# Patient Record
Sex: FEMALE | Race: WHITE | NOT HISPANIC OR LATINO | URBAN - METROPOLITAN AREA
[De-identification: names, ages, dates, MRNs, and addresses within clinical notes are randomized per-mention and may not be internally consistent; named-entity substitution may affect disease eponyms.]

---

## 2017-01-25 NOTE — PATIENT DISCUSSION
CATARACTS, OU - VISUALLY SIGNIFICANT. PT TO CALL WHEN READY TO PROCEED WITH SURGERY. SCHEDULE OD EYE  FIRST THEN LATER IN THE OS EYE  IF VISUAL SYMPTOMS PERSIST.

## 2017-01-25 NOTE — PATIENT DISCUSSION
Trial (take home)[facility][phone]&nbsp; &nbsp; &nbsp;

## 2017-02-08 NOTE — PATIENT DISCUSSION
CATARACT, OU - VISUALLY SIGNIFICANT. SCHEDULE PHACO WITH IOL OD FIRST THEN OS IF VISUAL SYMPTOMS PERSIST.

## 2017-02-08 NOTE — PATIENT DISCUSSION
Considering Cataract Surgery Counseling: I have discussed the option of scheduling surgery versus following, as well as the risks, benefits and alternatives of cataract surgery with the patient. It was explained that the surgery is elective, there is no rush and there is no harm in waiting to have surgery. It was also explained that there is no guarantee that removing the cataract will improve their vision. The patient understands and desires to follow for now and will consider his/her options.

## 2017-04-11 NOTE — PATIENT DISCUSSION
K SICCA: IMPROVING. CONTINUE DISAPPEARING PRESERVATIVE OR PRESERVATIVE FREE ARTIFICIAL TEARS BID &ndash; QID4-6X A DAY, OU AND THE DAILY INTAKE OF OMEGA-3 DHA/EPA FATTY ACIDS TO HELP RELIEVE SYMPTOMS. ADD PUNCTAL PLUGS TODAY. ADD NIGHTLY LUBRICATING OINTMENT OR GEL. CONTINUE RESTASIS BID.

## 2017-04-11 NOTE — PATIENT DISCUSSION
Continue: Restasis (cyclosporine): dropperette: 0.05% 1 drop twice a day as directed into both eyes 02-

## 2017-05-09 NOTE — PATIENT DISCUSSION
CATARACTS, OU - VISUALLY SIGNIFICANT. SCHEDULE PHACO WITH IOL OD FIRST THEN LATER OS IF VISUAL SYMPTOMS PERSIST.

## 2017-05-09 NOTE — PATIENT DISCUSSION
REFRACTIVE ERROR, OU - MYOPIA W/ASTIGMATISM :  PATIENT DESIRES TO HAVE THEIR REFRACTIVE ERROR SURGICALLY CORRECTED.

## 2017-05-09 NOTE — PATIENT DISCUSSION
Surgery Counseling:  I have discussed the option of glasses versus cataract surgery versus following, It was explained that when vision no longer meets the patient's visual needs and a new prescription for glasses is not likely to improve the patient's visual symptoms, the option of cataract surgery is a reasonable next step. It was explained that there is no guarantee that removing the cataract will improve their visual symptoms; however, it is believed that the cataract is contributing to the patient's visual impairment and surgery may noticeably improve both the visual and functional status of the patient. After this discussion, the patient desires to proceed with cataract surgery with implantation of an intraocular lens to improve their vision for driving.

## 2017-06-09 NOTE — PATIENT DISCUSSION
Besivance in the surgical eye 3 times per day for 10 days, then discontinue; Prolensa once per day for 4 weeks, then discontinue;

## 2017-06-28 NOTE — PATIENT DISCUSSION
S/P CATARACT SURGERY WITH IOL, OU. DOING WELL. CONTINUE PROLENSA QDAY. SPECS RX OFFERED. RETURN FOR FOLLOW-UP IN 3 MONTHS FOR DFE OU OR SOONER IF NEEDED.

## 2017-06-28 NOTE — PATIENT DISCUSSION
New Prescription: Maxitrol (neomycin-polymyxin-dexameth): ointment: 3.5-10,000-0.1 mg-unit/g-% a small amount at bedtime into both eyes 06-

## 2017-06-28 NOTE — PATIENT DISCUSSION
Stopped Today: Besivance (besifloxacin): drops,suspension: 0.6% 1 drop three times a day into affected eye 06-

## 2017-06-28 NOTE — PATIENT DISCUSSION
Meibomian Gland Dysfunction Counseling:  I have explained the diagnosis of MGD and its pathophysiology. I have explained to the patient that a cure for MGD is not usually possible, and successful management is dependent on patient compliance with the treatment regimen. I instructed the patient on using warm compresses and eyelid scrubs. I also instructed the patient on using artificial tears two to four times per day. Return for follow-up as scheduled or sooner if symptoms worsen.

## 2017-06-28 NOTE — PATIENT DISCUSSION
MEIBOMIAN GLAND DYSFUNCTION, OU:  PRESCRIBE WARM COMPRESSES AND EYELID SCRUBS QD-BID, ARTIFICIAL TEARS BID-QID, THE DAILY INTAKE OF OMEGA-3 FATTY ACIDS   RETURN FOR FOLLOW-UP AS SCHEDULED.

## 2017-09-27 NOTE — PATIENT DISCUSSION
K SICCA: IMPROVING. PT INTOLERANT OF RESTASIS - IMPROVED AFTER LL PLUGS. CONTINUE PRESERVATIVE FREE ARTIFICIAL TEARS BID &ndash; QID4-6X A DAY, OU AND THE DAILY INTAKE OF OMEGA-3 DHA/EPA FATTY ACIDS TO HELP RELIEVE SYMPTOMS.

## 2017-10-16 ENCOUNTER — IMPORTED ENCOUNTER (OUTPATIENT)
Dept: URBAN - METROPOLITAN AREA CLINIC 31 | Facility: CLINIC | Age: 57
End: 2017-10-16

## 2017-10-16 PROCEDURE — 92015 DETERMINE REFRACTIVE STATE: CPT

## 2017-10-16 PROCEDURE — 92004 COMPRE OPH EXAM NEW PT 1/>: CPT

## 2018-10-17 NOTE — PATIENT DISCUSSION
K SICCA: IMPROVING. PT INTOLERANT OF RESTASIS - IMPROVED AFTER LL PLUGS BUT PERSISTENT FLUCATUATION IN VISION. ADD XIIDRA BID OU. CONTINUE PRESERVATIVE FREE ARTIFICIAL TEARS BID &ndash; QID4-6X A DAY, OU AND THE DAILY INTAKE OF OMEGA-3 DHA/EPA FATTY ACIDS TO HELP RELIEVE SYMPTOMS.

## 2019-02-12 NOTE — PATIENT DISCUSSION
K SICCA: IMPROVING. IMPROVED AFTER LL PLUGS BUT PERSISTENT FLUCATUATION IN VISION. CONTINUE PRESERVATIVE FREE ARTIFICIAL TEARS BID &ndash; QID4-6X A DAY, OU AND THE DAILY INTAKE OF OMEGA-3 DHA/EPA FATTY ACIDS TO HELP RELIEVE SYMPTOMS.

## 2019-10-04 NOTE — PATIENT DISCUSSION
PT COULD NOT GET  TOBRADEX DUE TO COST SWITCH TO MAXITROL MARC TID, KEFLEX 500 TID FOR 1 WEEK, AGGRESSIVE LUBRICATION

## 2019-10-25 NOTE — PATIENT DISCUSSION
MELANIAUM , OS. OK TO CONTINUE  MAXITROL MARC AND WARM COMPRESSES. DISCUSSED THE OPTION OF ADDING KENALOG INJ IF STILL A PROBLEM NEXT VISIT.  FOLLOW

## 2019-10-25 NOTE — PATIENT DISCUSSION
Hordeolum Counseling:  I explained to the patient that a Hordeolum is an inflammatory reaction to trapped oil secretions in the eyelid. I also explained that while Hordeolum usually respond well to treatment, some people are prone to recurrences of them. The patient was instructed on the use of warm compresses on the Hordeolum for five to ten minutes, three to four times per day. Return for follow-up as scheduled or sooner if symptoms worsen.

## 2020-05-05 NOTE — PATIENT DISCUSSION
K SICCA: STABLE. S/P LL PLUGS. CONTINUE PRESERVATIVE FREE ARTIFICIAL TEARS BID &ndash; QID4-6X A DAY, OU AND THE DAILY INTAKE OF OMEGA-3 DHA/EPA FATTY ACIDS TO HELP RELIEVE SYMPTOMS.

## 2020-12-08 NOTE — PATIENT DISCUSSION
CHALAZION OS: PRESCRIBED WARM COMPRESSES, EYELID SCRUBS AND DOXYCYCLINE 50MG BID PO X 2 WEEKS AND TOBRADEX MARC QHS X 2 WEEKS.

## 2020-12-08 NOTE — PATIENT DISCUSSION
New Prescription: doxycycline hyclate (doxycycline hyclate): tablet: 50 mg 1 tablet twice a day as directed by mouth 12-

## 2020-12-08 NOTE — PATIENT DISCUSSION
New Prescription: TobraDex (tobramycin-dexamethasone): ointment: 0.3-0.1% 1 a small amount at bedtime as directed into left eye 12-

## 2021-06-01 NOTE — PATIENT DISCUSSION
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 S/P LL PLUGS. CONTINUE PRESERVATIVE FREE ARTIFICIAL TEARS BID &ndash; QID4-6X A DAY, OU AND THE DAILY INTAKE OF OMEGA-3 DHA/EPA FATTY ACIDS TO HELP RELIEVE SYMPTOMS.

## 2021-08-10 ENCOUNTER — OFFICE VISIT (OUTPATIENT)
Dept: URBAN - METROPOLITAN AREA CLINIC 63 | Facility: CLINIC | Age: 61
End: 2021-08-10

## 2021-08-19 ENCOUNTER — OFFICE VISIT (OUTPATIENT)
Dept: URBAN - METROPOLITAN AREA CLINIC 63 | Facility: CLINIC | Age: 61
End: 2021-08-19

## 2021-09-01 ENCOUNTER — OFFICE VISIT (OUTPATIENT)
Dept: URBAN - METROPOLITAN AREA TELEHEALTH 2 | Facility: TELEHEALTH | Age: 61
End: 2021-09-01

## 2021-10-04 ENCOUNTER — OFFICE VISIT (OUTPATIENT)
Dept: URBAN - METROPOLITAN AREA SURGERY CENTER 4 | Facility: SURGERY CENTER | Age: 61
End: 2021-10-04

## 2021-10-05 LAB — PATHOLOGY (INDENTED REPORT): (no result)

## 2021-10-13 ENCOUNTER — OFFICE VISIT (OUTPATIENT)
Dept: URBAN - METROPOLITAN AREA CLINIC 63 | Facility: CLINIC | Age: 61
End: 2021-10-13

## 2022-01-08 NOTE — PATIENT DISCUSSION
K SICCA: PRESCRIBED DISAPPEARING PRESERVATIVE OR PRESERVATIVE FREE ARTIFICIAL TEARS BID &ndash; QID4-6X A DAY, OU AND THE DAILY INTAKE OF OMEGA-3 DHA/EPA FATTY ACIDS TO HELP RELIEVE SYMPTOMS. ADD NIGHTLY LUBRICATING OINTMENT OR GEL. PRESCRIBED RESTASIS BID WITH LTOEMAX BRIDGE. [facility]VM portal message sent

## 2022-03-31 NOTE — PATIENT DISCUSSION
Educated patient on findings. Prescribe Keflex 500mg BID po x 7 days and Maxitrol jeovany QHS OD x 7 days. Continue warm compresses BID-QID and recommend Refresh MEGA3 BID-QID OU. Advised to call/RTC if si/sx persist or worsen. Monitor.

## 2022-04-01 ASSESSMENT — VISUAL ACUITY
OS_CC: 20/100-1
OS_SC: 20/25+3
OD_SC: 20/20-1
OD_CC: 20/400

## 2022-04-01 ASSESSMENT — TONOMETRY
OD_IOP_MMHG: 14
OS_IOP_MMHG: 14

## 2022-07-09 ENCOUNTER — TELEPHONE ENCOUNTER (OUTPATIENT)
Dept: URBAN - METROPOLITAN AREA CLINIC 121 | Facility: CLINIC | Age: 62
End: 2022-07-09

## 2022-07-09 RX ORDER — ASPIRIN 81 MG/1
TABLET, FILM COATED ORAL
Refills: 0 | OUTPATIENT
Start: 2021-08-31 | End: 2021-09-01

## 2022-07-09 RX ORDER — DULOXETINE 30 MG/1
CAPSULE, DELAYED RELEASE PELLETS ORAL
Refills: 0 | OUTPATIENT
Start: 2021-06-30 | End: 2021-09-01

## 2022-07-09 RX ORDER — ATORVASTATIN CALCIUM 10 MG/1
TABLET, FILM COATED ORAL
Refills: 0 | OUTPATIENT
Start: 2020-10-29 | End: 2021-09-01

## 2022-07-09 RX ORDER — ERGOCALCIFEROL 1.25 MG/1
CAPSULE ORAL
Refills: 0 | OUTPATIENT
Start: 2021-07-25 | End: 2021-09-01

## 2022-07-09 RX ORDER — TAMSULOSIN HYDROCHLORIDE 0.4 MG/1
CAPSULE ORAL
Refills: 0 | OUTPATIENT
Start: 2020-09-03 | End: 2021-08-31

## 2022-07-09 RX ORDER — SODIUM SULFATE, POTASSIUM SULFATE, MAGNESIUM SULFATE 17.5; 3.13; 1.6 G/ML; G/ML; G/ML
TAKE AS DIRECTED FOR COLON PREP SOLUTION, CONCENTRATE ORAL TAKE AS DIRECTED
Refills: 0 | OUTPATIENT
Start: 2016-04-25 | End: 2021-08-31

## 2022-07-10 ENCOUNTER — TELEPHONE ENCOUNTER (OUTPATIENT)
Dept: URBAN - METROPOLITAN AREA CLINIC 121 | Facility: CLINIC | Age: 62
End: 2022-07-10

## 2022-07-10 RX ORDER — VITAMIN B COMPLEX
CAPSULE ORAL
Refills: 0 | Status: ACTIVE | COMMUNITY
Start: 2021-09-01

## 2022-07-10 RX ORDER — ATORVASTATIN CALCIUM 10 MG/1
TABLET, FILM COATED ORAL
Refills: 0 | Status: ACTIVE | COMMUNITY
Start: 2021-09-01

## 2022-07-10 RX ORDER — ERGOCALCIFEROL 1.25 MG/1
CAPSULE ORAL
Refills: 0 | Status: ACTIVE | COMMUNITY
Start: 2021-09-01

## 2022-07-10 RX ORDER — CALCIUM CARBONATE/VITAMIN D3 500-10/5ML
LIQUID (ML) ORAL
Refills: 0 | Status: ACTIVE | COMMUNITY
Start: 2021-09-01

## 2022-07-10 RX ORDER — ASPIRIN 81 MG/1
TABLET, FILM COATED ORAL
Refills: 0 | Status: ACTIVE | COMMUNITY
Start: 2021-09-01

## 2022-07-10 RX ORDER — LORATADINE 5 MG/5 ML
SOLUTION, ORAL ORAL
Refills: 0 | Status: ACTIVE | COMMUNITY
Start: 2021-09-01

## 2022-07-10 RX ORDER — DULOXETINE 30 MG/1
CAPSULE, DELAYED RELEASE PELLETS ORAL
Refills: 0 | Status: ACTIVE | COMMUNITY
Start: 2021-09-01